# Patient Record
Sex: FEMALE | Race: WHITE | NOT HISPANIC OR LATINO | URBAN - METROPOLITAN AREA
[De-identification: names, ages, dates, MRNs, and addresses within clinical notes are randomized per-mention and may not be internally consistent; named-entity substitution may affect disease eponyms.]

---

## 2019-03-13 ENCOUNTER — EMERGENCY (EMERGENCY)
Facility: HOSPITAL | Age: 84
LOS: 1 days | Discharge: HOME | End: 2019-03-13
Attending: EMERGENCY MEDICINE
Payer: MEDICARE

## 2019-03-13 VITALS — DIASTOLIC BLOOD PRESSURE: 80 MMHG | HEART RATE: 57 BPM | SYSTOLIC BLOOD PRESSURE: 163 MMHG

## 2019-03-13 VITALS
OXYGEN SATURATION: 99 % | TEMPERATURE: 97 F | HEART RATE: 68 BPM | SYSTOLIC BLOOD PRESSURE: 173 MMHG | RESPIRATION RATE: 17 BRPM | HEIGHT: 68 IN | DIASTOLIC BLOOD PRESSURE: 62 MMHG | WEIGHT: 151.9 LBS

## 2019-03-13 PROCEDURE — 99284 EMERGENCY DEPT VISIT MOD MDM: CPT

## 2019-03-13 PROCEDURE — 99283 EMERGENCY DEPT VISIT LOW MDM: CPT | Mod: GC

## 2019-03-13 PROCEDURE — 97161 PT EVAL LOW COMPLEX 20 MIN: CPT

## 2019-03-13 RX ORDER — METHOCARBAMOL 500 MG/1
1 TABLET, FILM COATED ORAL
Qty: 28 | Refills: 0 | OUTPATIENT
Start: 2019-03-13 | End: 2019-03-19

## 2019-03-13 RX ORDER — METHOCARBAMOL 500 MG/1
750 TABLET, FILM COATED ORAL ONCE
Qty: 0 | Refills: 0 | Status: COMPLETED | OUTPATIENT
Start: 2019-03-13 | End: 2019-03-13

## 2019-03-13 RX ORDER — ACETAMINOPHEN 500 MG
975 TABLET ORAL ONCE
Qty: 0 | Refills: 0 | Status: COMPLETED | OUTPATIENT
Start: 2019-03-13 | End: 2019-03-13

## 2019-03-13 RX ADMIN — METHOCARBAMOL 750 MILLIGRAM(S): 500 TABLET, FILM COATED ORAL at 11:15

## 2019-03-13 RX ADMIN — Medication 975 MILLIGRAM(S): at 11:15

## 2019-03-13 RX ADMIN — Medication 975 MILLIGRAM(S): at 12:00

## 2019-03-13 NOTE — ED ADULT NURSE NOTE - OBJECTIVE STATEMENT
89 year old female presents to the ED through waiting room with son complaining of generalized chronic back pain, worsening over past 2 weeks. PMH of HTN. Pt. denies trauma/falls, numbness/tingling, urine or bowel incontinence, chest pain, SOB, nausea, vomiting, diarrhea, dysuria, hematuria, recent travel, recent sick contacts. Patient's son is concerned she is ambulating less due to chronic back pain and it is affecting her ADLs. Pt. states pain is intermittent. 89 year old female presents to the ED through waiting room with son complaining of generalized chronic back pain, worsening over past 2 weeks. PMH of HTN. Pt. denies trauma/falls, numbness/tingling, urine or bowel incontinence, chest pain, SOB, nausea, vomiting, diarrhea, dysuria, hematuria, recent travel, recent sick contacts. Patient's son is concerned she is ambulating less due to chronic back pain and it is affecting her ADLs. Pt. states pain is intermittent and she has not taken anything for pain today. Patient undressed and placed into gown, call bell in hand and side rails up with bed in lowest position for safety. blanket provided. Comfort and safety provided.

## 2019-03-13 NOTE — PHYSICAL THERAPY INITIAL EVALUATION ADULT - PLANNED THERAPY INTERVENTIONS, PT EVAL
balance training/bed mobility training/gait training/transfer training/GOAL: Pt will perform 2 stairs with or without U HR as needed within 3-4weeks.

## 2019-03-13 NOTE — PHYSICAL THERAPY INITIAL EVALUATION ADULT - ADDITIONAL COMMENTS
As per pt, pt lives in a private house w/ son and 2 steps to enter w/ UHR. PTA pt was independent w/ all mobility and ADLs. Pt owns RW and cane.

## 2019-03-13 NOTE — ED PROVIDER NOTE - OBJECTIVE STATEMENT
89F w/PMH htn, hypothyroid, OP, NIDDM, mild dementia, melanoma s/p excision (local), BCC s/p excision p/w back pain worse over the last few weeks. Pain is lateral, mostly b/l upper back but also intermittently lower back. Only occurs with movement, but is limiting mobility, now walking with cane and holding walls. Had fall 4w ago, evaluated at OSH with neg CTH, only head injury at that moment/denied back pain. Using APAP, ibuprofen, tiger balm and heating pads to mod effect. Never had imaging or PT. Has PMD Dr. Hodge. Denies f/c, cp/sob, abd pain, n/v/d/c, melena/BRBPR, focal weakness, numbness, trauma, injury/twisting, weight changes, night sweats, spinal manipulation/surgery.

## 2019-03-13 NOTE — ED PROVIDER NOTE - CLINICAL SUMMARY MEDICAL DECISION MAKING FREE TEXT BOX
89F w/PMH htn, NIDDM, hypothyroid, OP, DM, melanoma, BCC p/w back c/w MSK back pain; no e/o sig fracture, cord compression, epidural abscess/hematoma, malignancy on exam though recommended OP MRI (assuming patient would pursue intervention) to assess spinal bodies, discs. Analgesia, will attempt to set up PT.

## 2019-03-13 NOTE — CHART NOTE - NSCHARTNOTEFT_GEN_A_CORE
EMERGENCY ROOM SOCIAL WORK: Chart reviewed for discharge planning. Patient is a 89 y/o, female, with PMH of HTN, Hypothyroid, mild Dementia, presents to the ED c/o back pain s/p fall 1 month ago. Treating MD requesting assist with home care referral for P/T.    LMSW met with patient at bedside; patient awake and alert and oriented x3. Demographics verified. Patient identified son at bedside, Daniel (ph. [phone]) as caregiver and emergency contact. Patient has been residing with son Daniel and family for the past 3yrs in a private home in [place], [place] (ramp access with 2 steps to enter). Prior to fall 1 month ago, patient was independent in ADLs and ambulation. Currently, patient with decreased ambulation and with difficulties performing ADLs. Patient reports prior to fall she was made weekly walks to the neighborhood library using cane with no difficulties. Patient now requires use of cane/walker for safe ambulation. Patient owns a walker, cane, and wheelchair. Patient reports after fall PCP recommended CT-scans which resulted no fractures or broken bones and no brain abnormalities. PCP is Dr. Giacomo Callaway (ph. [phone]). Patient with Medicare and [employer] [facility] insurance benefits.    LMSW discussed home care referral for P/T at home. Patient reports preferring home P/T vs outpatient P/T as leaving the home requires 1 person assist due to decreased ambulation. Patient in agreement with [facility] as Home Care Agency. Treating MD notified of Medicare Face to Face. Referral sent with requested SOC for 3/14/2019. Patient and son informed of all details. Home Care referral sent with [facility]IN by SW Colleague with supporting documents. Social Work will follow-up with SOC once available.

## 2019-03-13 NOTE — ED PROVIDER NOTE - NEUROLOGICAL, MLM
Alert and oriented, no focal deficits, no motor or sensory deficits. 5/5 strength and SILT throughout, gait intact

## 2019-03-13 NOTE — ED PROVIDER NOTE - NSFOLLOWUPINSTRUCTIONS_ED_ALL_ED_FT
-please take methocarbamol (muscle relaxant as prescribed)  -please continue with tylenol 500-650mg every 4 hours as needed not to exceed 4000mg per day  -please take 400mg ibuprofen for pain (max 1x/day)  -please continue with heating pads and lidocaine patch as needed (can be purchased over the counter)    -should you develop inability to walk, weakness/numbness, changes in your urination or bowel movements, fevers/chills, weight loss, please see your doctor or return to the ER    -your doctor can order an MRI for you as needed     -physical therapy will come tomorrow to plan for services

## 2019-03-13 NOTE — ED PROVIDER NOTE - ATTENDING CONTRIBUTION TO CARE
Pt here with son presents with increased pain intermittently of upper and lower back, vague locations, occurs only when ambulating, resolves with rest, no assoc neuro symptoms (no weakness, paresthesia, incontinence).  On exam, pt is ambulatory without pain, no c/t/l spine td, denies pain currently.  Educated pt and son on treatment for back pains, medications and their side effects, back pain in elderly, and concern with back pain with h/o melanoma in past.  No indication for emergency xray or ct scan, but would consider outpt MRI back with caveat that if patient does not want aggressive therapy, then defer this test, otherwise discuss with PCP and consider obtaining this.  Moreso, physical therapy would be useful, SW consult and possible PT consult.

## 2019-03-13 NOTE — ED PROVIDER NOTE - MUSCULOSKELETAL, MLM
Spine is kyphotic with sig curvature, no midline ttp, range of motion is minimally limited, no muscle or joint tenderness

## 2019-03-13 NOTE — PHYSICAL THERAPY INITIAL EVALUATION ADULT - PERTINENT HX OF CURRENT PROBLEM, REHAB EVAL
Pt is a 89F w/PMH htn, hypothyroid, OP, NIDDM, mild dementia, melanoma s/p excision (local), BCC s/p excision p/w back pain worse over the last few weeks. Pain is lateral, mostly b/l upper back but also intermittently lower back.  Had fall 4w ago, evaluated at OSH with neg CTH, only head injury at that moment/denied back pain.

## 2019-03-13 NOTE — ED ADULT NURSE REASSESSMENT NOTE - NS ED NURSE REASSESS COMMENT FT1
PT at bedside assessing patient and brought walker to show patient how to properly ambulate with walker. Patient out of bed with physical therapist and using walker in hallway. Pt. states she is in no pain right now. Plan of care explained to patient and patient's son at bedside.

## 2020-03-10 NOTE — ED PROVIDER NOTE - NS_EDPROVIDERDISPOUSERTYPE_ED_A_ED
Breath Sounds equal & clear to percussion & auscultation, no accessory muscle use
Attending Attestation (For Attendings USE Only)...

## 2020-05-28 NOTE — ED ADULT NURSE NOTE - TEMPLATE
General Retinoid Dermatitis Normal Treatment: I recommended more frequent application of Cetaphil or CeraVe to the areas of dermatitis.

## 2020-12-08 ENCOUNTER — APPOINTMENT (OUTPATIENT)
Dept: SURGICAL ONCOLOGY | Facility: CLINIC | Age: 85
End: 2020-12-08
Payer: MEDICARE

## 2020-12-08 VITALS
BODY MASS INDEX: 22.22 KG/M2 | HEART RATE: 55 BPM | SYSTOLIC BLOOD PRESSURE: 150 MMHG | DIASTOLIC BLOOD PRESSURE: 75 MMHG | HEIGHT: 69 IN | WEIGHT: 150 LBS | RESPIRATION RATE: 15 BRPM | OXYGEN SATURATION: 96 %

## 2020-12-08 DIAGNOSIS — Z86.39 PERSONAL HISTORY OF OTHER ENDOCRINE, NUTRITIONAL AND METABOLIC DISEASE: ICD-10-CM

## 2020-12-08 DIAGNOSIS — Z85.820 PERSONAL HISTORY OF MALIGNANT MELANOMA OF SKIN: ICD-10-CM

## 2020-12-08 PROCEDURE — 99204 OFFICE O/P NEW MOD 45 MIN: CPT

## 2020-12-08 NOTE — HISTORY OF PRESENT ILLNESS
[de-identified] : Ms. SHIRIN CALHOUN  is a 89 year  old female  presenting for an initial consultation for newly diagnosed left chest melanoma, referred by Dr. Marcos Newsome.  She was previously a patient of my associate Dr. Ata Mims who has since retired.  She is accompanied by her daughter in law. \par \par She was seen by dermatology on 11/12/2020 and underwent a shave biopsy of a left lateral super chest lesion which revealed a MELANOMA, measuring at least 0.3 mm to the biopsy base and all lateral margins of the specimen.  No ulceration, vascular or neural invasion noted.  No dermal melanocytic mitoses are noted.  \par \par Ms. CALHOUN reports a longstanding history of sun exposure without the use of sunblock. History of sunburns.  Denies any other concerning lesions or masses. Denies bleeding or pruritic moles. Denies pain or constitutional symptoms.  Her daughter-in-law states that she experiences dyspnea on exertion and can only walk short distances with a cane and assistance.\par \par PMH: HTN, HLD, multinodular goiter, type 2 diabetes, COPD, osteoporosis, CKD II, mild dementia.  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 \par PSH: s/p excision of melanoma/skin cancer\par Social Hx: Drinks wine, never a smoker, three children, retired\par Family Hx: Daughter with breast cancer

## 2020-12-08 NOTE — CONSULT LETTER
[Dear  ___] : Dear  [unfilled], [Consult Letter:] : I had the pleasure of evaluating your patient, [unfilled]. [Please see my note below.] : Please see my note below. [Consult Closing:] : Thank you very much for allowing me to participate in the care of this patient.  If you have any questions, please do not hesitate to contact me. [Sincerely,] : Sincerely, [FreeTextEntry3] : Mookie Schafer MD, MPH, FACS\par Surgical Oncology\par [facility] [place]\par Associate Professor of Surgery\par Department of General Surgery\par [employer] [facility]

## 2020-12-08 NOTE — ASSESSMENT
[FreeTextEntry1] : IMP:\par Newly diagnosed left lateral super chest MELANOMA, measuring at least 0.3 mm, extending to all lateral margins of the specimen.  No ulceration, vascular or neural invasion noted.  No dermal melanocytic mitoses are noted.  \par \par PLAN:\par - Wide local excision of left chest T1 melanoma.  We discussed the risks, benefits, and alternatives of the procedure with the patient, she expressed understanding and agree to proceed.\par - [facility] slide review\par

## 2020-12-08 NOTE — PHYSICAL EXAM
[Normal] : supple, no neck mass and thyroid not enlarged [Normal Neck Lymph Nodes] : normal neck lymph nodes  [Normal Supraclavicular Lymph Nodes] : normal supraclavicular lymph nodes [Normal] : oriented to person, place and time, with appropriate affect [de-identified] : Left chest wall biopsy site with no residual pigmentation or satellite lesions

## 2020-12-22 ENCOUNTER — APPOINTMENT (OUTPATIENT)
Dept: SURGICAL ONCOLOGY | Facility: CLINIC | Age: 85
End: 2020-12-22
Payer: MEDICARE

## 2020-12-22 ENCOUNTER — LABORATORY RESULT (OUTPATIENT)
Age: 85
End: 2020-12-22

## 2020-12-22 PROCEDURE — 14000 TIS TRNFR TRUNK 10 SQ CM/<: CPT

## 2020-12-22 NOTE — PROCEDURE
[Patient] : patient [Risks] : risks [Benefits] : benefits [Alternatives] : alternatives [___ ml Inj] : [unfilled] ~Uml [1%] : 1% [With Epi] : with epinephrine [Lesions On The Chest] : left chest [Melody] : using Melody [Excisional: Margin ___mm] : the lesion was excised with a  [unfilled] ~Umm margin in an elliptical fashion [Cautery] : cautery [Vicryl] : Vicryl suture(s) [___ # of Sutures] : [unfilled] [Size: ___-0] : [unfilled]-0 [Running] : running [Sent to Pathology] : the excised lesion was place in buffered formalin and sent for pathology [Tolerated Well] : the patient tolerated the procedure well [No Complications] : there were no complications [___ Week(s)] : in [unfilled] week(s) [de-identified] : melanoma left chest [FreeTextEntry5] : 3 x 2 cm excision  [de-identified] : tissue was undermined and flaps were rotated into the incision to allow for a tension free closure of dermis and epidermis - total area 10 sq cm [de-identified] : left lateral chest melanoma short superior long lateral

## 2020-12-30 ENCOUNTER — RESULT REVIEW (OUTPATIENT)
Age: 85
End: 2020-12-30

## 2020-12-30 ENCOUNTER — OUTPATIENT (OUTPATIENT)
Dept: OUTPATIENT SERVICES | Facility: HOSPITAL | Age: 85
LOS: 1 days | End: 2020-12-30
Payer: MEDICARE

## 2020-12-30 DIAGNOSIS — D03.59 MELANOMA IN SITU OF OTHER PART OF TRUNK: ICD-10-CM

## 2020-12-30 PROCEDURE — 88321 CONSLTJ&REPRT SLD PREP ELSWR: CPT

## 2020-12-31 LAB — SURGICAL PATHOLOGY STUDY: SIGNIFICANT CHANGE UP

## 2021-01-05 ENCOUNTER — APPOINTMENT (OUTPATIENT)
Dept: SURGICAL ONCOLOGY | Facility: CLINIC | Age: 86
End: 2021-01-05
Payer: MEDICARE

## 2021-01-05 VITALS
BODY MASS INDEX: 23.54 KG/M2 | OXYGEN SATURATION: 95 % | TEMPERATURE: 98.1 F | HEART RATE: 55 BPM | RESPIRATION RATE: 16 BRPM | WEIGHT: 150 LBS | SYSTOLIC BLOOD PRESSURE: 160 MMHG | HEIGHT: 67 IN | DIASTOLIC BLOOD PRESSURE: 51 MMHG

## 2021-01-05 PROCEDURE — 99024 POSTOP FOLLOW-UP VISIT: CPT

## 2021-01-05 NOTE — REASON FOR VISIT
[Follow-Up Visit] : a follow-up visit for [FreeTextEntry2] : status post in office excision of left chest melanoma on 12/22/20

## 2021-01-05 NOTE — HISTORY OF PRESENT ILLNESS
[de-identified] : Ms. SHIRIN CALHOUN  is a 89 year  old female who returns for follow up.  She was initially seen in consultation on 12/8/20 for newly diagnosed left chest melanoma, referred by Dr. Marcos Newsome.  She was previously a patient of my associate Dr. Ata Mims who has since retired.  She is accompanied by her daughter in law. \par \par She was seen by dermatology on 11/12/2020 and underwent a shave biopsy of a left lateral super chest lesion which revealed a MELANOMA, measuring at least 0.3 mm to the biopsy base and all lateral margins of the specimen.  No ulceration, vascular or neural invasion noted.  No dermal melanocytic mitoses are noted.  \par \par Ms. CALHOUN reports a longstanding history of sun exposure without the use of sunblock. History of sunburns.  Denies any other concerning lesions or masses. Denies bleeding or pruritic moles. Denies pain or constitutional symptoms.  Her daughter-in-law states that she experiences dyspnea on exertion and can only walk short distances with a cane and assistance.\par \par PMH: HTN, HLD, multinodular goiter, type 2 diabetes, COPD, osteoporosis, CKD II, mild dementia.  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 \par PSH: s/p excision of melanoma/skin cancer\par Social Hx: Drinks wine, never a smoker, three children, retired\par Family Hx: Daughter with breast cancer\par \par INTERVAl HISTORY:\par ***OFFICE SURGERY: status post wide excision of left chest melanoma on 12/22/20.\par ***PATHOLOGY: scar with no residual lesion (negative margins)\par \par 1/5/21- Today she is feeling well.  The incision is healing well with no evidence of infection.

## 2021-01-05 NOTE — ASSESSMENT
[FreeTextEntry1] : IMP:\par Status post wide excision of left chest melanoma on 12/22/20.\par Final pathology demonstrates scar with no residual lesion (negative margins).\par \par PLAN:\par 1) No further treatment needed\par 2) Continue dermatology surveillance\par 3) RTO in 6 months\par \par

## 2021-01-05 NOTE — CONSULT LETTER
[Dear  ___] : Dear  [unfilled], [Consult Letter:] : I had the pleasure of evaluating your patient, [unfilled]. [Please see my note below.] : Please see my note below. [Consult Closing:] : Thank you very much for allowing me to participate in the care of this patient.  If you have any questions, please do not hesitate to contact me. [Sincerely,] : Sincerely, [FreeTextEntry3] : Mookie Schafer MD, MPH, FACS\par Surgical Oncology\par [facility] [place]\par Associate Professor of Surgery\par Department of General Surgery\par [employer] at [facility]

## 2021-01-05 NOTE — PHYSICAL EXAM
[Normal] : supple, no neck mass and thyroid not enlarged [Normal Neck Lymph Nodes] : normal neck lymph nodes  [Normal Supraclavicular Lymph Nodes] : normal supraclavicular lymph nodes [Normal Axillary Lymph Nodes] : normal axillary lymph nodes [Normal] : oriented to person, place and time, with appropriate affect [de-identified] : Left chest excision site healing well with no evidence of infection

## 2021-09-07 ENCOUNTER — APPOINTMENT (OUTPATIENT)
Dept: SURGICAL ONCOLOGY | Facility: CLINIC | Age: 86
End: 2021-09-07
Payer: MEDICARE

## 2021-09-07 VITALS
OXYGEN SATURATION: 95 % | HEART RATE: 64 BPM | RESPIRATION RATE: 16 BRPM | BODY MASS INDEX: 23.86 KG/M2 | SYSTOLIC BLOOD PRESSURE: 115 MMHG | HEIGHT: 67 IN | WEIGHT: 152 LBS | DIASTOLIC BLOOD PRESSURE: 65 MMHG

## 2021-09-07 PROCEDURE — 99213 OFFICE O/P EST LOW 20 MIN: CPT

## 2021-09-07 NOTE — PHYSICAL EXAM
[Normal] : supple, no neck mass and thyroid not enlarged [Normal Neck Lymph Nodes] : normal neck lymph nodes  [Normal Supraclavicular Lymph Nodes] : normal supraclavicular lymph nodes [Normal Axillary Lymph Nodes] : normal axillary lymph nodes [Normal] : oriented to person, place and time, with appropriate affect [de-identified] : Left chest excision site healed with no evidence of local or regional recurrence

## 2021-09-07 NOTE — CONSULT LETTER
[Dear  ___] : Dear  [unfilled], [Consult Letter:] : I had the pleasure of evaluating your patient, [unfilled]. [Please see my note below.] : Please see my note below. [Consult Closing:] : Thank you very much for allowing me to participate in the care of this patient.  If you have any questions, please do not hesitate to contact me. [Sincerely,] : Sincerely, [FreeTextEntry3] : Mookie Schafer MD, MPH, FACS\par Surgical Oncology\par [facility] [place]\par Associate Professor of Surgery\par Department of General Surgery\par [employer] at [facility]

## 2021-09-07 NOTE — HISTORY OF PRESENT ILLNESS
[de-identified] : Ms. SHIRIN CALHOUN  is a 89 year  old female who returns for a  month follow up.  She was initially seen in consultation on 12/8/20 for newly diagnosed left chest melanoma, referred by Dr. Marcos Newsome.  She was previously a patient of my associate Dr. Ata Mims who has since retired.  She is accompanied by her daughter in law. \par \par She was seen by dermatology on 11/12/2020 and underwent a shave biopsy of a left lateral super chest lesion which revealed a MELANOMA, measuring at least 0.3 mm to the biopsy base and all lateral margins of the specimen.  No ulceration, vascular or neural invasion noted.  No dermal melanocytic mitoses are noted.  \par \par Ms. CALHOUN reports a longstanding history of sun exposure without the use of sunblock. History of sunburns.  Denies any other concerning lesions or masses. Denies bleeding or pruritic moles. Denies pain or constitutional symptoms.  Her daughter-in-law states that she experiences dyspnea on exertion and can only walk short distances with a cane and assistance.\par \par PMH: HTN, HLD, multinodular goiter, type 2 diabetes, COPD, osteoporosis, CKD II, mild dementia.  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 \par PSH: s/p excision of melanoma/skin cancer\par Social Hx: Drinks wine, never a smoker, three children, retired\par Family Hx: Daughter with breast cancer\par \par INTERVAl HISTORY:\par ***OFFICE SURGERY: status post wide excision of left chest melanoma on 12/22/20.\par ***PATHOLOGY: scar with no residual lesion (negative margins)\par \par 1/5/21- Today she is feeling well.  The incision is healing well with no evidence of infection.\par \par 9/7/21- She continues routine follow up with dermatology.  Her last visit was in May 2021 and no biopsies were performed.  Her next appointment is scheduled for next week.  She is feeling well and denies any new or changing skin lesions or constitutional symptoms.

## 2022-03-29 ENCOUNTER — APPOINTMENT (OUTPATIENT)
Dept: SURGICAL ONCOLOGY | Facility: CLINIC | Age: 87
End: 2022-03-29
Payer: MEDICARE

## 2022-04-26 ENCOUNTER — APPOINTMENT (OUTPATIENT)
Dept: SURGICAL ONCOLOGY | Facility: CLINIC | Age: 87
End: 2022-04-26
Payer: MEDICARE

## 2022-04-26 VITALS
TEMPERATURE: 98.9 F | BODY MASS INDEX: 24.16 KG/M2 | DIASTOLIC BLOOD PRESSURE: 65 MMHG | OXYGEN SATURATION: 98 % | WEIGHT: 145 LBS | HEIGHT: 65 IN | RESPIRATION RATE: 15 BRPM | HEART RATE: 56 BPM | SYSTOLIC BLOOD PRESSURE: 131 MMHG

## 2022-04-26 PROCEDURE — 99213 OFFICE O/P EST LOW 20 MIN: CPT

## 2022-05-04 NOTE — CONSULT LETTER
[Dear  ___] : Dear  [unfilled], [Consult Letter:] : I had the pleasure of evaluating your patient, [unfilled]. [Please see my note below.] : Please see my note below. [Consult Closing:] : Thank you very much for allowing me to participate in the care of this patient.  If you have any questions, please do not hesitate to contact me. [Sincerely,] : Sincerely, [FreeTextEntry3] : Mookie Schafer MD, MPH, FACS\par Surgical Oncology\par [facility] [place]\par Associate Professor of Surgery\par Department of General Surgery\par [employer] at [facility]

## 2022-05-04 NOTE — PHYSICAL EXAM
[de-identified] : Left chest excision site healed with no evidence of local or regional recurrence

## 2022-05-04 NOTE — REASON FOR VISIT
[Melanoma] : melanoma [FreeTextEntry2] : status post in office excision of left chest melanoma on 12/22/20

## 2022-05-04 NOTE — ASSESSMENT
[FreeTextEntry1] : IMP:\par Status post wide excision of left chest T1 (0.3 mm) melanoma on 12/22/20.\par Final pathology demonstrates scar with no residual lesion (negative margins).\par No evidence of recurrence \par \par PLAN:\par 1) No further treatment needed\par 2) Continue dermatology surveillance\par 3) RTO Q6 months x 5 years \par \par

## 2022-05-04 NOTE — HISTORY OF PRESENT ILLNESS
[de-identified] : Ms. SHIRIN CALHOUN  is a 89 year  old female who returns for a melanoma follow up.  \par She was initially seen in consultation on 12/8/20 for newly diagnosed left chest melanoma, referred by Dr. Marcos Newsome.  She was previously a patient of my associate Dr. Ata Mims who has since retired.  She is accompanied by her daughter in law. \par \par She was seen by dermatology on 11/12/2020 and underwent a shave biopsy of a left lateral super chest lesion which revealed a MELANOMA, measuring at least 0.3 mm to the biopsy base and all lateral margins of the specimen.  No ulceration, vascular or neural invasion noted.  No dermal melanocytic mitoses are noted.  \par \par Ms. CALHOUN reports a longstanding history of sun exposure without the use of sunblock. History of sunburns.  Denies any other concerning lesions or masses. Denies bleeding or pruritic moles. Denies pain or constitutional symptoms.  Her daughter-in-law states that she experiences dyspnea on exertion and can only walk short distances with a cane and assistance.\par \par PMH: HTN, HLD, multinodular goiter, type 2 diabetes, COPD, osteoporosis, CKD II, mild dementia.  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 \par PSH: s/p excision of melanoma/skin cancer\par Social Hx: Drinks wine, never a smoker, three children, retired\par Family Hx: Daughter with breast cancer\par \par INTERVAl HISTORY:\par ***OFFICE SURGERY: status post wide excision of left chest melanoma on 12/22/20.\par ***PATHOLOGY: scar with no residual lesion (negative margins)\par \par 1/5/21- Today she is feeling well.  The incision is healing well with no evidence of infection.\par \par 9/7/21- She continues routine follow up with dermatology.  Her last visit was in May 2021 and no biopsies were performed.  Her next appointment is scheduled for next week.  She is feeling well and denies any new or changing skin lesions or constitutional symptoms.\par \par 4/26/22- Pt. continues f/u with dermatology. She was last seen by Dr. Newsome on 3/22/22 with no new biopsies. Denies new skin lesions, masses or bleeding/pruritic moles. Doing well.

## 2022-08-13 ENCOUNTER — INPATIENT (INPATIENT)
Facility: HOSPITAL | Age: 87
LOS: 6 days | Discharge: HOME HEALTH | End: 2022-08-20
Attending: FAMILY MEDICINE

## 2022-08-13 ENCOUNTER — OUTPATIENT (OUTPATIENT)
Dept: OUTPATIENT SERVICES | Facility: HOSPITAL | Age: 87
LOS: 1 days | End: 2022-08-13

## 2022-08-13 PROCEDURE — 71250 CT THORAX DX C-: CPT | Mod: 26,MA

## 2022-08-13 PROCEDURE — 71045 X-RAY EXAM CHEST 1 VIEW: CPT | Mod: 26

## 2022-08-13 PROCEDURE — 99285 EMERGENCY DEPT VISIT HI MDM: CPT | Mod: CS

## 2022-08-13 PROCEDURE — 93010 ELECTROCARDIOGRAM REPORT: CPT

## 2022-08-13 PROCEDURE — 70450 CT HEAD/BRAIN W/O DYE: CPT | Mod: 26,MH

## 2022-08-14 ENCOUNTER — OUTPATIENT (OUTPATIENT)
Dept: OUTPATIENT SERVICES | Facility: HOSPITAL | Age: 87
LOS: 1 days | End: 2022-08-14

## 2022-08-14 PROCEDURE — 76770 US EXAM ABDO BACK WALL COMP: CPT | Mod: 26

## 2022-08-15 ENCOUNTER — OUTPATIENT (OUTPATIENT)
Dept: OUTPATIENT SERVICES | Facility: HOSPITAL | Age: 87
LOS: 1 days | End: 2022-08-15

## 2022-08-16 ENCOUNTER — OUTPATIENT (OUTPATIENT)
Dept: OUTPATIENT SERVICES | Facility: HOSPITAL | Age: 87
LOS: 1 days | End: 2022-08-16

## 2022-08-16 PROCEDURE — 93306 TTE W/DOPPLER COMPLETE: CPT | Mod: 26

## 2022-08-17 ENCOUNTER — OUTPATIENT (OUTPATIENT)
Dept: OUTPATIENT SERVICES | Facility: HOSPITAL | Age: 87
LOS: 1 days | End: 2022-08-17

## 2022-08-18 ENCOUNTER — OUTPATIENT (OUTPATIENT)
Dept: OUTPATIENT SERVICES | Facility: HOSPITAL | Age: 87
LOS: 1 days | End: 2022-08-18

## 2022-08-29 DIAGNOSIS — Z79.01 LONG TERM (CURRENT) USE OF ANTICOAGULANTS: ICD-10-CM

## 2022-08-29 DIAGNOSIS — A40.1 SEPSIS DUE TO STREPTOCOCCUS, GROUP B: ICD-10-CM

## 2022-08-29 DIAGNOSIS — E78.5 HYPERLIPIDEMIA, UNSPECIFIED: ICD-10-CM

## 2022-08-29 DIAGNOSIS — R25.1 TREMOR, UNSPECIFIED: ICD-10-CM

## 2022-08-29 DIAGNOSIS — I10 ESSENTIAL (PRIMARY) HYPERTENSION: ICD-10-CM

## 2022-08-29 DIAGNOSIS — R50.9 FEVER, UNSPECIFIED: ICD-10-CM

## 2022-08-29 DIAGNOSIS — Z20.822 CONTACT WITH AND (SUSPECTED) EXPOSURE TO COVID-19: ICD-10-CM

## 2022-08-29 DIAGNOSIS — G93.41 METABOLIC ENCEPHALOPATHY: ICD-10-CM

## 2022-08-29 DIAGNOSIS — J44.9 CHRONIC OBSTRUCTIVE PULMONARY DISEASE, UNSPECIFIED: ICD-10-CM

## 2022-08-29 DIAGNOSIS — R64 CACHEXIA: ICD-10-CM

## 2022-08-29 DIAGNOSIS — E11.9 TYPE 2 DIABETES MELLITUS WITHOUT COMPLICATIONS: ICD-10-CM

## 2022-08-29 DIAGNOSIS — N39.0 URINARY TRACT INFECTION, SITE NOT SPECIFIED: ICD-10-CM

## 2022-08-29 DIAGNOSIS — F03.90 UNSPECIFIED DEMENTIA WITHOUT BEHAVIORAL DISTURBANCE: ICD-10-CM

## 2022-09-06 DIAGNOSIS — A41.9 SEPSIS, UNSPECIFIED ORGANISM: ICD-10-CM

## 2022-09-06 DIAGNOSIS — R41.82 ALTERED MENTAL STATUS, UNSPECIFIED: ICD-10-CM

## 2022-09-06 DIAGNOSIS — N39.0 URINARY TRACT INFECTION, SITE NOT SPECIFIED: ICD-10-CM

## 2022-09-08 DIAGNOSIS — N39.0 URINARY TRACT INFECTION, SITE NOT SPECIFIED: ICD-10-CM

## 2022-09-08 DIAGNOSIS — R78.81 BACTEREMIA: ICD-10-CM

## 2022-09-08 DIAGNOSIS — R41.82 ALTERED MENTAL STATUS, UNSPECIFIED: ICD-10-CM

## 2022-09-09 DIAGNOSIS — A40.8 OTHER STREPTOCOCCAL SEPSIS: ICD-10-CM

## 2022-09-09 DIAGNOSIS — E11.9 TYPE 2 DIABETES MELLITUS WITHOUT COMPLICATIONS: ICD-10-CM

## 2022-09-09 DIAGNOSIS — R41.82 ALTERED MENTAL STATUS, UNSPECIFIED: ICD-10-CM

## 2022-09-13 DIAGNOSIS — A40.8 OTHER STREPTOCOCCAL SEPSIS: ICD-10-CM

## 2022-09-13 DIAGNOSIS — R41.82 ALTERED MENTAL STATUS, UNSPECIFIED: ICD-10-CM

## 2022-09-13 DIAGNOSIS — E11.9 TYPE 2 DIABETES MELLITUS WITHOUT COMPLICATIONS: ICD-10-CM

## 2022-09-23 DIAGNOSIS — R41.82 ALTERED MENTAL STATUS, UNSPECIFIED: ICD-10-CM

## 2022-09-23 DIAGNOSIS — A40.8 OTHER STREPTOCOCCAL SEPSIS: ICD-10-CM

## 2022-09-23 DIAGNOSIS — E11.9 TYPE 2 DIABETES MELLITUS WITHOUT COMPLICATIONS: ICD-10-CM

## 2022-10-24 PROBLEM — C43.59 MALIGNANT MELANOMA OF SKIN OF CHEST: Status: ACTIVE | Noted: 2020-12-04

## 2022-10-24 NOTE — HISTORY OF PRESENT ILLNESS
[de-identified] : Ms. SHIRIN CALHOUN  is a 89 year  old female who returns for a melanoma follow up.  \par She was initially seen in consultation on 12/8/20 for newly diagnosed left chest melanoma, referred by Dr. Marcos Newsome.  She was previously a patient of my associate Dr. Ata Mims who has since retired.  She is accompanied by her daughter in law. \par \par She was seen by dermatology on 11/12/2020 and underwent a shave biopsy of a left lateral super chest lesion which revealed a MELANOMA, measuring at least 0.3 mm to the biopsy base and all lateral margins of the specimen.  No ulceration, vascular or neural invasion noted.  No dermal melanocytic mitoses are noted.  \par \par Ms. CALHOUN reports a longstanding history of sun exposure without the use of sunblock. History of sunburns.  Denies any other concerning lesions or masses. Denies bleeding or pruritic moles. Denies pain or constitutional symptoms.  Her daughter-in-law states that she experiences dyspnea on exertion and can only walk short distances with a cane and assistance.\par \par PMH: HTN, HLD, multinodular goiter, type 2 diabetes, COPD, osteoporosis, CKD II, mild dementia.  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 \par PSH: s/p excision of melanoma/skin cancer\par Social Hx: Drinks wine, never a smoker, three children, retired\par Family Hx: Daughter with breast cancer\par \par INTERVAl HISTORY:\par ***OFFICE SURGERY: status post wide excision of left chest melanoma on 12/22/20.\par ***PATHOLOGY: scar with no residual lesion (negative margins)\par \par 1/5/21- Today she is feeling well.  The incision is healing well with no evidence of infection.\par \par 9/7/21- She continues routine follow up with dermatology.  Her last visit was in May 2021 and no biopsies were performed.  Her next appointment is scheduled for next week.  She is feeling well and denies any new or changing skin lesions or constitutional symptoms.\par \par 4/26/22- Pt. continues f/u with dermatology. She was last seen by Dr. Newsome on 3/22/22 with no new biopsies. Denies new skin lesions, masses or bleeding/pruritic moles. Doing well. \par \par 10/25/22-

## 2022-10-24 NOTE — PHYSICAL EXAM
[Normal] : supple, no neck mass and thyroid not enlarged [Normal Neck Lymph Nodes] : normal neck lymph nodes  [Normal Supraclavicular Lymph Nodes] : normal supraclavicular lymph nodes [Normal Axillary Lymph Nodes] : normal axillary lymph nodes [Normal] : oriented to person, place and time, with appropriate affect [de-identified] : Left chest excision site healed with no evidence of local or regional recurrence

## 2022-10-24 NOTE — REASON FOR VISIT
[Follow-Up Visit] : a follow-up visit for [Melanoma] : melanoma [FreeTextEntry2] : status post in office excision of left chest melanoma on 12/22/20

## 2022-10-25 ENCOUNTER — APPOINTMENT (OUTPATIENT)
Dept: SURGICAL ONCOLOGY | Facility: CLINIC | Age: 87
End: 2022-10-25

## 2022-10-25 DIAGNOSIS — C43.59 MALIGNANT MELANOMA OF OTHER PART OF TRUNK: ICD-10-CM
